# Patient Record
Sex: FEMALE | Race: WHITE
[De-identification: names, ages, dates, MRNs, and addresses within clinical notes are randomized per-mention and may not be internally consistent; named-entity substitution may affect disease eponyms.]

---

## 2020-11-01 ENCOUNTER — HOSPITAL ENCOUNTER (EMERGENCY)
Dept: HOSPITAL 97 - ER | Age: 45
Discharge: HOME | End: 2020-11-01
Payer: SELF-PAY

## 2020-11-01 VITALS — DIASTOLIC BLOOD PRESSURE: 75 MMHG | OXYGEN SATURATION: 99 % | SYSTOLIC BLOOD PRESSURE: 141 MMHG

## 2020-11-01 VITALS — TEMPERATURE: 97.5 F

## 2020-11-01 DIAGNOSIS — Z23: ICD-10-CM

## 2020-11-01 DIAGNOSIS — S60.561A: Primary | ICD-10-CM

## 2020-11-01 DIAGNOSIS — F17.210: ICD-10-CM

## 2020-11-01 PROCEDURE — 96372 THER/PROPH/DIAG INJ SC/IM: CPT

## 2020-11-01 PROCEDURE — 90714 TD VACC NO PRESV 7 YRS+ IM: CPT

## 2020-11-01 PROCEDURE — 90471 IMMUNIZATION ADMIN: CPT

## 2020-11-01 PROCEDURE — 99283 EMERGENCY DEPT VISIT LOW MDM: CPT

## 2020-11-01 NOTE — ER
Nurse's Notes                                                                                     

 [facility]                                                                 

Name: Светлана Saenz                                                                                   

Age: 45 yrs                                                                                       

Sex: Female                                                                                       

: 1975                                                                                   

MRN: [id number]                                                                                   

Arrival Date: 2020                                                                          

Time: 18:58                                                                                       

Account#: [id number]                                                                            

Bed 8                                                                                             

Private MD:                                                                                       

Diagnosis: Insect bite (nonvenomous) of right hand                                                

                                                                                                  

Presentation:                                                                                     

                                                                                             

19:13 Chief complaint: Patient states: "I am pretty sure I was bit by a brown recluse last    jd3 

      Friday.". Coronavirus screen: At this time, the client does not indicate any symptoms       

      associated with coronavirus-19. Ebola Screen: Patient negative for fever greater than       

      or equal to 101.5 degrees Fahrenheit, and additional compatible Ebola Virus Disease         

      symptoms. Initial Sepsis Screen: Does the patient meet any 2 criteria? No. Patient's        

      initial sepsis screen is negative. Does the patient have a suspected source of              

      infection? No. Patient's initial sepsis screen is negative. Risk Assessment: Do you         

      want to hurt yourself or someone else? Patient reports no desire to harm self or            

      others. Onset of symptoms was 2020.                                             

19:13 Method Of Arrival: Ambulatory                                                           jd3 

19:13 Acuity: KOLBY 3                                                                           jd3 

                                                                                                  

OB/GYN:                                                                                           

19:15 LMP N/A - Irregular menses                                                              jd3 

                                                                                                  

Historical:                                                                                       

- Allergies:                                                                                      

19:15 No Known Allergies;                                                                     jd3 

- Home Meds:                                                                                      

19:15 None [Active];                                                                          jd3 

- PMHx:                                                                                           

19:15 None;                                                                                   jd3 

- PSHx:                                                                                           

19:15 None;                                                                                   jd3 

                                                                                                  

- Immunization history:: Adult Immunizations up to date, Last tetanus immunization:               

  unknown.                                                                                        

- Social history:: Smoking status: Patient reports the use of cigarette tobacco                   

  products, denies chronic smoking, but will smoke occasionally.                                  

                                                                                                  

                                                                                                  

Screenin:30 Abuse screen: Denies threats or abuse. Denies injuries from another. Nutritional        rr5 

      screening: No deficits noted. Tuberculosis screening: No symptoms or risk factors           

      identified. Fall Risk None identified. Total Gutiérrez Fall Scale indicates No Risk (0-24       

      pts).                                                                                       

                                                                                                  

Assessment:                                                                                       

19:30 General: Appears in no apparent distress. comfortable, Behavior is calm, cooperative,   rr5 

      appropriate for age.                                                                        

19:30 Pain: Complains of pain in dorsal aspect of middle phalanx of right index finger Pain   rr5 

      Quality of pain is described as aching, Pain began suddenly, Is intermittent. Neuro:        

      Level of Consciousness is awake, alert, obeys commands, Oriented to person, place,          

      time, situation. Cardiovascular: Capillary refill < 3 seconds Patient's skin is warm        

      and dry. Respiratory: Airway Respiratory effort is even, unlabored, Respiratory pattern     

      is regular, symmetrical. GI: No signs and/or symptoms were reported involving the           

      gastrointestinal system. : No signs and/or symptoms were reported regarding the           

      genitourinary system. EENT: No signs and/or symptoms were reported regarding the EENT       

      system. Derm: Skin is intact, is healthy with good turgor, Skin temperature is warm         

      Wound noted dorsal aspect of middle phalanx of right index finger Wound is insect bite      

      draining clear liquid. Musculoskeletal: Circulation, motion, and sensation intact.          

      Capillary refill < 3 seconds.                                                               

20:14 Reassessment: Patient appears in no apparent distress at this time. Patient is alert,   rr5 

      oriented x 3, equal unlabored respirations, skin warm/dry/pink. discharge instruction       

      given and explained without complaints made.                                                

                                                                                                  

Vital Signs:                                                                                      

19:15  / 103; Pulse 91; Resp 17 S; Temp 97.5(O); Pulse Ox 100% on R/A; Weight 66.22 kg  jd3 

      (R); Height 5 ft. 3 in. (160.02 cm) (R); Pain 8/10;                                         

20:15  / 75; Pulse 85; Resp 16; Pulse Ox 99% ;                                          rr5 

19:15 Body Mass Index 25.86 (66.22 kg, 160.02 cm)                                             jd3 

                                                                                                  

ED Course:                                                                                        

18:58 Patient arrived in ED.                                                                  ag5 

19:14 Triage completed.                                                                       jd3 

19:17 Arm band placed on.                                                                     jd3 

19:21 Edilson Apple NP is PHCP.                                                           pm1 

19:21 Elia Garcia MD is Attending Physician.                                             pm1 

19:30 Patient has correct armband on for positive identification. Bed in low position. Call   rr5 

      light in reach.                                                                             

19:41 Esau Snow RN is Primary Nurse.                                                    rr5 

20:15 No provider procedures requiring assistance completed. Patient did not have IV access   rr5 

      during this emergency room visit.                                                           

                                                                                                  

Administered Medications:                                                                         

19:40 Drug: Clindamycin 600 mg Route: IM; Site: right gluteus;                                rr5 

20:16 Follow up: Response: No adverse reaction                                                rr5 

19:41 Drug: Norco 5 mg-325 mg 1 tabs {Note: rass 0.} Route: PO;                               rr5 

20:16 Follow up: Response: No adverse reaction; RASS: Alert and Calm (0)                      rr5 

19:50 Drug: Tetanus-Diphtheria Toxoid Adult 0.5 ml {: [employer]. Exp:         rr5 

      2022. Lot #: [id number]. } Route: IM; Site: left deltoid;                                  

20:15 Follow up: Response: No adverse reaction                                                rr5 

                                                                                                  

                                                                                                  

Outcome:                                                                                          

20:04 Discharge ordered by MD.                                                                pm1 

20:15 Discharged to home ambulatory, with family.                                             rr5 

20:15 Condition: stable                                                                           

20:15 Discharge instructions given to patient, Instructed on discharge instructions, follow       

      up and referral plans. medication usage, Demonstrated understanding of instructions,        

      follow-up care, medications, Prescriptions given X 2.                                       

20:16 Patient left the ED.                                                                    rr5 

                                                                                                  

Signatures:                                                                                       

Edilson Apple, ROBERTO                    NP   pm1                                                  

Mack Farah RN                    RN   jd3                                                  

Esau Snow RN                      RN   rr5                                                  

Srinath Loredo                                ag5                                                  

                                                                                                  

Corrections: (The following items were deleted from the chart)                                    

20:16 19:30 Derm: Skin is intact, is healthy with good turgor, Skin temperature is warm Wound rr5 

      noted dorsal aspect of middle phalanx of right index finger Wound is spider bite            

      draining clear liquid rr5                                                                   

                                                                                                  

**************************************************************************************************

## 2020-11-01 NOTE — EDPHYS
Physician Documentation                                                                           

 [facility]                                                                 

Name: Светлана Saenz                                                                                   

Age: 45 yrs                                                                                       

Sex: Female                                                                                       

: 1975                                                                                   

MRN: [id number]                                                                                   

Arrival Date: 2020                                                                          

Time: 18:58                                                                                       

Account#: [id number]                                                                            

Bed 8                                                                                             

Private MD:                                                                                       

ED Physician Elia Garcia                                                                      

HPI:                                                                                              

                                                                                             

19:33 This 45 yrs old  Female presents to ER via Ambulatory with complaints of       pm1 

      Spider Bite.                                                                                

19:33 Onset: The symptoms/episode began/occurred 2 day(s) ago.                                pm1 

19:33 Associated signs and symptoms: The patient has no apparent associated signs or          pm1 

      symptoms, Pertinent negatives: fever.                                                       

19:33 Modifying factors: The symptoms are alleviated by expressing discharge from wound, the  pm1 

      symptoms are aggravated by movement. Associated signs and symptoms: Pertinent               

      negatives: cyanosis distally, decreased sensation distally, numbness distally, tingling     

      distally. Severity of symptoms: in the emergency department the symptoms have improved.     

      The patient has not experienced similar symptoms in the past. Patient is concerned that     

      she was bitten by a spider. did not see a spider bite her but saw a spider in her bed a     

      few days later. Patient bonnie swelling to right index finger PIP. Expressed some pus        

      from wound this AM.                                                                         

                                                                                                  

OB/GYN:                                                                                           

19:15 LMP N/A - Irregular menses                                                              jd3 

                                                                                                  

Historical:                                                                                       

- Allergies:                                                                                      

19:15 No Known Allergies;                                                                     jd3 

- Home Meds:                                                                                      

19:15 None [Active];                                                                          jd3 

- PMHx:                                                                                           

19:15 None;                                                                                   jd3 

- PSHx:                                                                                           

19:15 None;                                                                                   jd3 

                                                                                                  

- Immunization history:: Adult Immunizations up to date, Last tetanus immunization:               

  unknown.                                                                                        

- Social history:: Smoking status: Patient reports the use of cigarette tobacco                   

  products, denies chronic smoking, but will smoke occasionally.                                  

                                                                                                  

                                                                                                  

ROS:                                                                                              

19:33 Constitutional: Negative for fever, chills, and weight loss.                            pm1 

19:33 Neuro: Negative for headache, weakness, numbness, tingling, and seizure.                    

19:33 MS/extremity: Positive for pain, of the Right index finger, Negative for decreased          

      range of motion, deformity.                                                                 

19:33 Skin: Positive for abscess, of the Right index finger.                                      

19:33 All other systems are negative.                                                             

                                                                                                  

Exam:                                                                                             

19:33 Constitutional:  This is a well developed, well nourished patient who is awake, alert,  pm1 

      and in no acute distress. Head/Face:  Normocephalic, atraumatic.                            

19:33 Cardiovascular: Exam negative for  acute changes, Rate: normal, Rhythm: regular,            

      Pulses: no pulse deficits are appreciated.                                                  

19:33 Respiratory: Exam negative for  acute changes, respiratory distress, shortness of           

      breath.                                                                                     

19:33 Skin: Appearance: normal except for affected area, phlegmon to right index finger PIP.      

      No fluctuance, discharge, or surrounding cellulitis present.  Negative for any              

      kanavel's signs.                                                                            

19:33 Neuro: Exam negative for acute changes, Orientation: is normal, Mentation: is normal,       

      Motor: is normal, moves all fours, strength is normal, strength is 5/5 in all               

      extremities.                                                                                

                                                                                                  

Vital Signs:                                                                                      

19:15  / 103; Pulse 91; Resp 17 S; Temp 97.5(O); Pulse Ox 100% on R/A; Weight 66.22 kg  jd3 

      (R); Height 5 ft. 3 in. (160.02 cm) (R); Pain 8/10;                                         

20:15  / 75; Pulse 85; Resp 16; Pulse Ox 99% ;                                          rr5 

19:15 Body Mass Index 25.86 (66.22 kg, 160.02 cm)                                             jd3 

                                                                                                  

MDM:                                                                                              

19:31 Patient medically screened.                                                             pm1 

20:03 Data reviewed: vital signs. Data interpreted: Pulse oximetry: on room air is 100 %.     pm1 

      Interpretation: normal. Counseling: I had a detailed discussion with the patient and/or     

      guardian regarding: the historical points, exam findings, and any diagnostic results        

      supporting the discharge/admit diagnosis, the need for outpatient follow up, a hand         

      specialist, to return to the emergency department if symptoms worsen or persist or if       

      there are any questions or concerns that arise at home.                                     

                                                                                                  

Administered Medications:                                                                         

19:40 Drug: Clindamycin 600 mg Route: IM; Site: right gluteus;                                rr5 

20:16 Follow up: Response: No adverse reaction                                                rr5 

19:41 Drug: Norco 5 mg-325 mg 1 tabs {Note: rass 0.} Route: PO;                               rr5 

20:16 Follow up: Response: No adverse reaction; RASS: Alert and Calm (0)                      rr5 

19:50 Drug: Tetanus-Diphtheria Toxoid Adult 0.5 ml {: RetiDiag. Exp:         rr5 

      2022. Lot #: [id number]. } Route: IM; Site: left deltoid;                                  

20:15 Follow up: Response: No adverse reaction                                                rr5 

                                                                                                  

                                                                                                  

Disposition:                                                                                      

                                                                                             

07:23 Co-signature as Attending Physician, Elia Garcia MD.                                 mh7 

                                                                                                  

Disposition:                                                                                      

20 20:04 Discharged to Home. Impression: Insect bite (nonvenomous) of right hand.           

- Condition is Stable.                                                                            

- Discharge Instructions: Insect Bite.                                                            

- Prescriptions for Tylenol- Codeine #3 300-30 mg Oral Tablet - take 2 tablets by ORAL            

  route every 6 hours As needed; 20 tablet. Bactrim - 160 mg Oral Tablet - take 1           

  tablet by ORAL route every 12 hours for 10 days; 20 tablet.                                     

- Medication Reconciliation Form, Thank You Letter, Antibiotic Education, Prescription            

  Opioid Use form.                                                                                

- Follow up: Emergency Department; When: As needed; Reason: Worsening of condition.               

  Follow up: Private Physician; When: 2 - 3 days; Reason: Recheck today's complaints,             

  Continuance of care, Re-evaluation by your physician.                                           

- Problem is new.                                                                                 

- Symptoms have improved.                                                                         

                                                                                                  

                                                                                                  

                                                                                                  

Signatures:                                                                                       

Edilson Apple, NP                    NP   pm1                                                  

Mack Farah RN                    RN   jd3                                                  

Esau Snow RN                      RN   rr5                                                  

Elia Garcia MD MD   7                                                  

                                                                                                  

Corrections: (The following items were deleted from the chart)                                    

                                                                                             

20:16 20:04 2020 20:04 Discharged to Home. Impression: Insect bite (nonvenomous) of     rr5 

      right hand. Condition is Stable. Forms are Medication Reconciliation Form, Thank You        

      Letter, Antibiotic Education, Prescription Opioid Use. Follow up: Emergency Department;     

      When: As needed; Reason: Worsening of condition. Follow up: Private Physician; When: 2      

      - 3 days; Reason: Recheck today's complaints, Continuance of care, Re-evaluation by         

      your physician. Problem is new. Symptoms have improved. pm1                                 

21:36 19:33 Modifying factors: The patient symptoms are alleviated by pm1                     pm1 

                                                                                                  

**************************************************************************************************

## 2021-06-01 ENCOUNTER — HOSPITAL ENCOUNTER (EMERGENCY)
Dept: HOSPITAL 97 - ER | Age: 46
Discharge: LEFT BEFORE DISCHARGE | End: 2021-06-01
Payer: SELF-PAY

## 2021-06-01 VITALS — OXYGEN SATURATION: 99 % | TEMPERATURE: 97.5 F | DIASTOLIC BLOOD PRESSURE: 102 MMHG | SYSTOLIC BLOOD PRESSURE: 163 MMHG

## 2021-06-01 DIAGNOSIS — Z53.21: ICD-10-CM

## 2021-06-01 DIAGNOSIS — R05: Primary | ICD-10-CM

## 2021-06-01 DIAGNOSIS — R07.89: ICD-10-CM

## 2021-06-01 DIAGNOSIS — R51.9: ICD-10-CM

## 2021-06-01 PROCEDURE — 99281 EMR DPT VST MAYX REQ PHY/QHP: CPT

## 2021-06-01 PROCEDURE — 93005 ELECTROCARDIOGRAM TRACING: CPT

## 2021-06-01 NOTE — XMS REPORT
Continuity of Care Document

                             Created on:2021



Patient:SHERI SETH

Sex:Female

:1975

External Reference #:[id number]





Demographics







                          Address                   [address]



                                                    [place] [address]

 

                          Home Phone                [phone]

 

                          Work Phone                [phone]

 

                          Email Address             [email or web address]

 

                          Preferred Language        English

 

                          Marital Status            Unknown

 

                          Taoism Affiliation     Unknown

 

                          Race                      Unknown

 

                          Additional Race(s)        Unavailable



                                                    White

 

                          Ethnic Group              Not  or 









Author







                          Organization              [facility]

t

 

                          Address                   [address]



                                                    [place] [address]

 

                          Phone                     [phone]









Support







                Name            Relationship    Address         Phone

 

                Sam         Other           Unavailable     [phone]









Care Team Providers







                    Name                Role                Phone

 

                    Asked,  Pcp         Primary Care Physician Unavailable

 

                    TERRI              Attending Clinician Unavailable









Problems

This patient has no known problems.



Allergies, Adverse Reactions, Alerts

This patient has no known allergies or adverse reactions.



Social History







           Social Habit Start Date Stop Date  Quantity   Comments   Source

 

           History of                       Cigarette Smoker            [facility] [place] 

[employer]



           tobacco use                                             

 

           Tobacco use and 2020 Never used            [facility]

ethodist



           exposure   00:00:00   00:00:00                         

 

           Alcohol intake 2020 Ex-drinker            [facility]

thodist



                      00:00:00   00:00:00   (finding)             

 

           Sex Assigned At 1975                       [facility]

ethodist



           Birth      00:00:00   00:00:00                         









                Smoking Status  Start Date      Stop Date       Source

 

                Smoker, current status unknown 2020 00:00:00              

   [facility]







Medications







       Ordered Filled Start  Stop   Current Ordering Indication Dosage Frequency

 Signature

                    Comments            Components          Source



     Medication Medication Date Date Medication? Clinician                (SIG) 

          



     Name Name                                                   

 

     ondansetron            Yes            4mg  Q8H  Take 1           [facility]

[facility]



     ODT (ZOFRAN      1-24                               tablet (4           [facility]

[facility]



     ODT) 4 MG      00:00:                               mg total)           st



     disintegrat      00                                 by mouth           



     ing tablet                                         every 8           



                                                  (eight)           



                                                  hours as           



                                                  needed for           



                                                  nausea or           



                                                  vomiting           



                                                  for up to           



                                                  30 doses.           







Procedures

This patient has no known procedures.



Encounters







        Start   End     Encounter Admission Attending Care    Care    Encounter 

Source



        Date/Time Date/Time Type    Type    Clinicians Facility Department ID   

   

 

        2020 Emergency         [facility]     [id number]     [id number]

[address] [facility]



        00:00:00 00:00:00                 [facility]                   [id number]     [facility]

i



                                                                        st







Results

This patient has no known results.

## 2021-06-01 NOTE — ER
Nurse's Notes                                                                                     

 [facility]                                                                 

Name: Светлана Saenz                                                                                   

Age: 45 yrs                                                                                       

Sex: Female                                                                                       

: 1975                                                                                   

MRN: [id number]                                                                                   

Arrival Date: 2021                                                                          

Time: 14:53                                                                                       

Account#: [id number]                                                                            

Bed Waiting                                                                                       

Private MD:                                                                                       

Diagnosis:                                                                                        

                                                                                                  

Presentation:                                                                                     

                                                                                             

14:57 Chief complaint: Patient states: Tension like HA, cough and intermittent midsternal     jl7 

      chest pressure x 2 days, nausea started this morning. Coronavirus screen: Client denies     

      travel out of the [place] in the last 14 days. At this time, the client does not indicate      

      any symptoms associated with coronavirus-19. Ebola Screen: No symptoms or risks             

      identified at this time. Initial Sepsis Screen: Does the patient meet any 2 criteria?       

      No. Patient's initial sepsis screen is negative. Does the patient have a suspected          

      source of infection? No. Patient's initial sepsis screen is negative. Risk Assessment:      

      Do you want to hurt yourself or someone else? Patient reports no desire to harm self or     

      others. Onset of symptoms was May 31, 2021. Care prior to arrival: None.                    

14:57 Method Of Arrival: Ambulatory                                                           [facility] 

14:57 Acuity: KOLBY 3                                                                           jl7 

                                                                                                  

OB/GYN:                                                                                           

15:00 LMP N/A - Irregular menses                                                              jl7 

                                                                                                  

Historical:                                                                                       

- Allergies:                                                                                      

15:00 No Known Allergies;                                                                     jl7 

- Home Meds:                                                                                      

15:00 None [Active];                                                                          jl7 

- PMHx:                                                                                           

15:00 None;                                                                                   jl7 

- PSHx:                                                                                           

15:00 None;                                                                                   jl7 

                                                                                                  

- Immunization history:: Adult Immunizations up to date, Client reports having NOT                

  received the Covid vaccine.                                                                     

- Social history:: Smoking status: Patient/guardian denies using tobacco, Stopped _               

  months ago 2.                                                                                   

                                                                                                  

                                                                                                  

Vital Signs:                                                                                      

14:57  / 102; Pulse 68; Resp 17 S; Temp 97.5(O); Pulse Ox 99% on R/A; Weight 63.5 kg    jl7 

      (R); Height 5 ft. 3 in. (160.02 cm) (R); Pain 6/10;                                         

14:57 Body Mass Index 24.80 (63.50 kg, 160.02 cm)                                             jl7 

                                                                                                  

ED Course:                                                                                        

14:53 Patient arrived in ED.                                                                  ds1 

14:59 Triage completed.                                                                       jl7 

15:00 Arm band placed on right wrist.                                                         jl7 

15:00 EKG completed in triage. Results shown to MD.                                           katie 

19:53 Patient's name was called from ER lobby. No response. Unable to locate patient. Will    bb  

      disposition as left without being seen by a provider.                                       

                                                                                                  

Administered Medications:                                                                         

No medications were administered                                                                  

                                                                                                  

                                                                                                  

Outcome:                                                                                          

19:54 Patient left the ED.                                                                    bb  

                                                                                                  

Signatures:                                                                                       

Gina Briones                                ds1                                                  

Lauryn Staley RN                     EMILIA   bb                                                   

Mandy Mccullough RN RN jl7                                                  

                                                                                                  

**************************************************************************************************

## 2021-06-02 NOTE — EKG
Test Date:    2021-06-01               Test Time:    15:05:33

Technician:   TRAV                                    

                                                     

MEASUREMENT RESULTS:                                       

Intervals:                                           

Rate:         60                                     

ME:           128                                    

QRSD:         92                                     

QT:           378                                    

QTc:          378                                    

Axis:                                                

P:            24                                     

ME:           128                                    

QRS:          4                                      

T:            60                                     

                                                     

INTERPRETIVE STATEMENTS:                                       

                                                     

Normal sinus rhythm

Incomplete right bundle branch block

Borderline ECG

No previous ECG available for comparison



Electronically Signed On 06-02-21 11:53:56 CDT by Julián Garsia